# Patient Record
Sex: FEMALE | Race: WHITE | Employment: OTHER | URBAN - METROPOLITAN AREA
[De-identification: names, ages, dates, MRNs, and addresses within clinical notes are randomized per-mention and may not be internally consistent; named-entity substitution may affect disease eponyms.]

---

## 2023-03-06 ENCOUNTER — HOSPITAL ENCOUNTER (OUTPATIENT)
Age: 68
Setting detail: OUTPATIENT SURGERY
Discharge: HOME | End: 2023-03-06
Attending: INTERNAL MEDICINE | Admitting: INTERNAL MEDICINE
Payer: MEDICARE

## 2023-03-06 ENCOUNTER — ANESTHESIA (OUTPATIENT)
Dept: ENDOSCOPY | Age: 68
End: 2023-03-06
Payer: MEDICARE

## 2023-03-06 ENCOUNTER — ANESTHESIA EVENT (OUTPATIENT)
Dept: ENDOSCOPY | Age: 68
End: 2023-03-06
Payer: MEDICARE

## 2023-03-06 VITALS
SYSTOLIC BLOOD PRESSURE: 118 MMHG | DIASTOLIC BLOOD PRESSURE: 76 MMHG | BODY MASS INDEX: 35.85 KG/M2 | RESPIRATION RATE: 16 BRPM | OXYGEN SATURATION: 94 % | WEIGHT: 210 LBS | TEMPERATURE: 97.2 F | HEIGHT: 64 IN | HEART RATE: 70 BPM

## 2023-03-06 DIAGNOSIS — Z12.11 COLON CANCER SCREENING: ICD-10-CM

## 2023-03-06 DIAGNOSIS — Z86.010 HISTORY OF COLON POLYPS: ICD-10-CM

## 2023-03-06 DIAGNOSIS — Z80.0 FAMILY HISTORY OF COLON CANCER: ICD-10-CM

## 2023-03-06 LAB
GLUCOSE BLD-MCNC: 195 MG/DL (ref 70–99)
PERFORMED ON: ABNORMAL

## 2023-03-06 PROCEDURE — 7100000010 HC PHASE II RECOVERY - FIRST 15 MIN: Performed by: INTERNAL MEDICINE

## 2023-03-06 PROCEDURE — 2580000003 HC RX 258: Performed by: FAMILY MEDICINE

## 2023-03-06 PROCEDURE — 3700000000 HC ANESTHESIA ATTENDED CARE: Performed by: INTERNAL MEDICINE

## 2023-03-06 PROCEDURE — 6360000002 HC RX W HCPCS: Performed by: NURSE ANESTHETIST, CERTIFIED REGISTERED

## 2023-03-06 PROCEDURE — 7100000011 HC PHASE II RECOVERY - ADDTL 15 MIN: Performed by: INTERNAL MEDICINE

## 2023-03-06 PROCEDURE — 2500000003 HC RX 250 WO HCPCS: Performed by: NURSE ANESTHETIST, CERTIFIED REGISTERED

## 2023-03-06 PROCEDURE — 3700000001 HC ADD 15 MINUTES (ANESTHESIA): Performed by: INTERNAL MEDICINE

## 2023-03-06 PROCEDURE — 2709999900 HC NON-CHARGEABLE SUPPLY: Performed by: INTERNAL MEDICINE

## 2023-03-06 PROCEDURE — 3609010600 HC COLONOSCOPY POLYPECTOMY SNARE/COLD BIOPSY: Performed by: INTERNAL MEDICINE

## 2023-03-06 PROCEDURE — 88305 TISSUE EXAM BY PATHOLOGIST: CPT

## 2023-03-06 RX ORDER — ROSUVASTATIN CALCIUM 20 MG/1
20 TABLET, COATED ORAL DAILY
COMMUNITY
Start: 2023-02-04

## 2023-03-06 RX ORDER — PROPOFOL 10 MG/ML
INJECTION, EMULSION INTRAVENOUS CONTINUOUS PRN
Status: DISCONTINUED | OUTPATIENT
Start: 2023-03-06 | End: 2023-03-06 | Stop reason: SDUPTHER

## 2023-03-06 RX ORDER — PROPOFOL 10 MG/ML
INJECTION, EMULSION INTRAVENOUS PRN
Status: DISCONTINUED | OUTPATIENT
Start: 2023-03-06 | End: 2023-03-06 | Stop reason: SDUPTHER

## 2023-03-06 RX ORDER — LIDOCAINE HYDROCHLORIDE 20 MG/ML
INJECTION, SOLUTION EPIDURAL; INFILTRATION; INTRACAUDAL; PERINEURAL PRN
Status: DISCONTINUED | OUTPATIENT
Start: 2023-03-06 | End: 2023-03-06 | Stop reason: SDUPTHER

## 2023-03-06 RX ORDER — SODIUM CHLORIDE, SODIUM LACTATE, POTASSIUM CHLORIDE, CALCIUM CHLORIDE 600; 310; 30; 20 MG/100ML; MG/100ML; MG/100ML; MG/100ML
INJECTION, SOLUTION INTRAVENOUS CONTINUOUS
Status: DISCONTINUED | OUTPATIENT
Start: 2023-03-06 | End: 2023-03-06 | Stop reason: HOSPADM

## 2023-03-06 RX ADMIN — PROPOFOL 100 MG: 10 INJECTION, EMULSION INTRAVENOUS at 13:36

## 2023-03-06 RX ADMIN — PROPOFOL 140 MCG/KG/MIN: 10 INJECTION, EMULSION INTRAVENOUS at 13:36

## 2023-03-06 RX ADMIN — SODIUM CHLORIDE, POTASSIUM CHLORIDE, SODIUM LACTATE AND CALCIUM CHLORIDE: 600; 310; 30; 20 INJECTION, SOLUTION INTRAVENOUS at 13:13

## 2023-03-06 RX ADMIN — LIDOCAINE HYDROCHLORIDE 50 MG: 20 INJECTION, SOLUTION EPIDURAL; INFILTRATION; INTRACAUDAL; PERINEURAL at 13:36

## 2023-03-06 ASSESSMENT — PAIN SCALES - GENERAL
PAINLEVEL_OUTOF10: 0
PAINLEVEL_OUTOF10: 0

## 2023-03-06 ASSESSMENT — PAIN - FUNCTIONAL ASSESSMENT: PAIN_FUNCTIONAL_ASSESSMENT: NONE - DENIES PAIN

## 2023-03-06 NOTE — PROGRESS NOTES
Ambulatory Surgery/Procedure Discharge Note  Pt arrived in sds Iv patent and on L side  Vs stable  Dr Kowalski here to see and talk to pt and family  O2 dcd   Verbal and written discharge instructions given to pt and family  IV dcd fluids taken well  Pt dressed and dcd per wheelchair to car with family present    Vitals:    03/06/23 1425   BP: 112/67   Pulse: 83   Resp: 16   Temp: 97.2 °F (36.2 °C)   SpO2: 93%       In: 900 [I.V.:900]  Out: -     Restroom use offered before discharge.  Yes    Pain assessment:  level of pain (1-10, 10 severe),   Pain Level: 0        Patient discharged to home/self care. Patient discharged via wheel chair by transporter to waiting family/S.O.       3/6/2023 2:27 PM

## 2023-03-06 NOTE — DISCHARGE INSTRUCTIONS
Vasquez Arreola MD   Physician   Gastroenterology   Op Note       Signed   Date of Service:  3/6/2023  1:31 PM              Case Time:  Procedures:  Surgeons:    3/6/2023  1:31 PM COLONOSCOPY POLYPECTOMY SNARE/COLD BIOPSY    Vasquez Arreola MD               Signed                                                                                                                                           Colonoscopy Procedure  Note              Patient: Prakash Verma                    : 1955                      CSN: [id number]     Procedure: Colonoscopy with snare and forceps polypectomy     Date:  3/6/2023     Surgeon:  Vasquez Arreola MD     Referring Physician:   Lex RIVAS     Preoperative Diagnosis: Personal history of adenomatous colonic polyps     Postoperative Diagnosis: Internal and external hemorrhoids, diverticulosis, multiple colon polyp     Anesthesia:  MAC per anesthesia record. EBL:    <5 mL     Indications: This is a 75y.o. year old female who presents today with history of adenomatous colonic polyps on last colonoscopy , need for surveillance. Procedure: An informed consent was obtained from the patient after explanation of indications, benefits, possible risks and complications of the procedure. The patient was then taken to the endoscopy suite, placed in the left lateral decubitus position, and the above IV anesthesia was administered. The Olympus colonoscope was inserted through the anus into the rectum. The scope was then carefully advanced through the length of the colon to the cecum. The ileocecal valve and appendiceal orifice was visualized. The quality of preparation was good. Water was used to clear remaining stool from the colon to allow for careful examination of the mucosa with insufflation. The scope was carefully withdrawn for more than 6 minutes. Retroflexion was performed in the rectum.   The scope was then straightened, the colon was decompressed, and the scope was withdrawn. Findings:   -Perianal and digital rectal exam: Small circumferential external hemorrhoids.  -Rectum: Medium sized internal hemorrhoids noted on retroflexion.   -Sigmoid: Moderately severe diverticulosis with mycosis coli, multiple medium sized diverticula, and tortuosity noted in sigmoid  -Descending: 3 sessile polyps ranging in size from 3 to 6 mm removed completely with cold snare and sent for histology  -Transverse: 4 sessile polyps ranging in size from 2 to 5 mm removed completely with cold forceps, and cold snare, sent for histology. -Ascending: Normal  -Cecum: 2 mm flat polyp removed completely with cold forceps and sent for histology     -- Polyps were submitted together given small nature of the lesions. The patient tolerated the procedure well and was taken to the PACU in good condition. Complications: No immediate complications. Impression:    -Internal and external hemorrhoids  -Diverticulosis  -Small and diminutive colonic polyps x8     Recommendations:    -Await pathology results  -Repeat colonoscopy in 3 year pending polyp histology  -25 g dietary fiber daily recommended for diverticulosis     Ty Segura MD  [facility]  3/6/2023               ENDOSCOPY DISCHARGE INSTRUCTIONS:    Call the physician that did your procedure for any questions or concern:    [facility]: [phone]  DR. FARIDEH ALVES      ACTIVITY:    There are potential side effects to the medications used for sedation and anesthesia during your procedure. These include:  Dizziness or light-headedness, confusion or memory loss, delayed reaction times, loss of coordination, nausea and vomiting. Because of your increased risk for injury, we ask that you observe the following precautions: For the next 24 hours,  DO NOT operate an automobile, bicycle, motorcycle, , power tools or large equipment of any kind.   Do not drink alcohol, sign any legal documents or make any legal decisions for 24 hours. Do not bend your head over lower than your heart. DO sit on the side of bed/couch awhile before getting up. Plan on bedrest or quiet relaxation today. You may resume normal activities in 24 hours. DIET:    Your first meal today should be light, avoiding spicy and fatty foods. If you tolerate this first meal, then you may advance to your regular diet unless otherwise advised by your physician. NORMAL SYMPTOMS:  -Mild sore throat if youve had an EGD   -Gaseous discomfort    NOTIFY YOUR PHYSICIAN IF THESE SYMPTOMS OCCUR:  1. Fever (greater than 100)  5. Increased abdominal bloating  2. Severe pain    6. Excessive bleeding  3. Nausea and vomiting  7. Chest pain                                                                    4. Chills    8. Shortness of breath    ADDITIONAL INSTRUCTIONS:    Biopsy results: Call [address],[facility] for biopsy results in 1 week    Educational Information:          Please review these discharge instructions this evening or tomorrow for  information you may have forgotten. We want to thank you for choosing the [facility] as your health care provider. We always strive to provide you with excellent care while you are here. You may receive a survey in the mail regarding your care. We would appreciate you taking a few minutes of your time to complete this survey. ENDOSCOPY DISCHARGE INSTRUCTIONS:    Call the physician that did your procedure for any questions or concern:    [facility]: [phone]  DR. FARIDEH ALVES      ACTIVITY:    There are potential side effects to the medications used for sedation and anesthesia during your procedure. These include:  Dizziness or light-headedness, confusion or memory loss, delayed reaction times, loss of coordination, nausea and vomiting. Because of your increased risk for injury, we ask that you observe the following precautions:   For the next 24 hours,  DO NOT operate an automobile, bicycle, motorcycle, , power tools or large equipment of any kind. Do not drink alcohol, sign any legal documents or make any legal decisions for 24 hours. Do not bend your head over lower than your heart. DO sit on the side of bed/couch awhile before getting up. Plan on bedrest or quiet relaxation today. You may resume normal activities in 24 hours. DIET:    Your first meal today should be light, avoiding spicy and fatty foods. If you tolerate this first meal, then you may advance to your regular diet unless otherwise advised by your physician. NORMAL SYMPTOMS:  -Mild sore throat if youve had an EGD   -Gaseous discomfort    NOTIFY YOUR PHYSICIAN IF THESE SYMPTOMS OCCUR:  1. Fever (greater than 100)  5. Increased abdominal bloating  2. Severe pain    6. Excessive bleeding  3. Nausea and vomiting  7. Chest pain                                                                    4. Chills    8. Shortness of breath    ADDITIONAL INSTRUCTIONS:    Biopsy results: Call [address],[facility] for biopsy results in 1 week    Educational Information:          Please review these discharge instructions this evening or tomorrow for  information you may have forgotten. We want to thank you for choosing the [facility] as your health care provider. We always strive to provide you with excellent care while you are here. You may receive a survey in the mail regarding your care. We would appreciate you taking a few minutes of your time to complete this survey.

## 2023-03-06 NOTE — H&P
Gastroenterology Note             Pre-operative History and Physical    Patient: Horace Vega  : 1955  CSN: [id number]    History Obtained From:  Patient and/or guardian. HISTORY OF PRESENT ILLNESS:    Indication: The patient is a 75 y.o. female  here for colonoscopy. Personal history of adenomatous colonic polyps. Past Medical History:    Past Medical History:   Diagnosis Date    Dental crowns present     Diabetes mellitus ([address]     Hyperlipidemia     Hypertension      Past Surgical History:    Past Surgical History:   Procedure Laterality Date    ANKLE SURGERY Left 2014    excision cyst,neurolysis    CHOLECYSTECTOMY      FOOT SURGERY Left     great toe    HAND SURGERY Left     finger    JOINT REPLACEMENT Right     knee    OTHER SURGICAL HISTORY      Sensitive to medications    SHOULDER SURGERY Bilateral      Medications Prior to Admission:   No current facility-administered medications on file prior to encounter. Current Outpatient Medications on File Prior to Encounter   Medication Sig Dispense Refill    metFORMIN (GLUCOPHAGE) 500 MG tablet Take 1,000 mg by mouth 2 times daily (with meals)      rosuvastatin (CRESTOR) 20 MG tablet Take 20 mg by mouth daily      HYDROcodone-acetaminophen (NORCO) 5-325 MG per tablet Take 1 tablet by mouth every 6 hours as needed for Pain for up to 50 doses. (Patient not taking: Reported on 3/6/2023) 50 tablet 0    gabapentin (NEURONTIN) 300 MG capsule Take 1 capsule by mouth every evening. (Patient not taking: Reported on 3/6/2023) 60 capsule 1    amLODIPine-benazepril (LOTREL) 5-10 MG per capsule Take 1 capsule by mouth daily. aspirin 81 MG tablet Take 81 mg by mouth daily. choline fenofibrate (TRILIPIX) 135 MG CPDR delayed release capsule Take 135 mg by mouth daily. (Patient not taking: Reported on 3/6/2023)      glipiZIDE (GLUCOTROL) 5 MG tablet Take 5 mg by mouth 2 times daily (before meals).       Loratadine 10 MG CAPS Take  by mouth as needed. sAXagliptin (ONGLYZA) 5 MG TABS tablet Take 5 mg by mouth daily. (Patient not taking: Reported on 3/6/2023)      sertraline (ZOLOFT) 50 MG tablet Take 25 mg by mouth daily. simvastatin (ZOCOR) 20 MG tablet Take 20 mg by mouth nightly. (Patient not taking: Reported on 3/6/2023)      atomoxetine (STRATTERA) 40 MG capsule Take 40 mg by mouth daily. vitamin D (ERGOCALCIFEROL) 12282 UNITS CAPS capsule Take 50,000 Units by mouth once a week. Allergies:  Biaxin [clarithromycin]      Social History:   Social History     Tobacco Use    Smoking status: Former     Types: Cigarettes     Quit date:      Years since quittin.1    Smokeless tobacco: Not on file   Substance Use Topics    Alcohol use: No     Family History:   History reviewed. No pertinent family history. PHYSICAL EXAM:      /77   Pulse 93   Temp 97.5 °F (36.4 °C) (Temporal)   Resp 15   Ht 5' 3.5\" (1.613 m)   Wt 210 lb (95.3 kg)   LMP 1999   SpO2 97%   BMI 36.62 kg/m²  I        Heart:   RRR, normal s1s2    Lungs:  CTA bilat,  Normal effort    Abdomen:   NT, ND      ASA Grade:  ASA 3 - Patient with moderate systemic disease with functional limitations    Mallampati Class: 2          ASSESSMENT AND PLAN:    1. Patient is a 75 y.o. female here for Colonoscopy with MAC.   2.  Procedure options, risks, and benefits reviewed with patient who expresses understanding and consent.     Ty Segura MD,   [facility]ce [facility]  3/6/2023

## 2023-03-06 NOTE — ANESTHESIA POSTPROCEDURE EVALUATION
Department of Anesthesiology  Postprocedure Note    Patient: Nnamdi Link  MRN: [id number]  YOB: 1955  Date of evaluation: 3/6/2023      Procedure Summary     Date: 03/06/23 Room / Location: Erica Pauljacqui / [facility]    Anesthesia Start: 1437 Anesthesia Stop: 5462    Procedure: COLONOSCOPY POLYPECTOMY SNARE/COLD BIOPSY Diagnosis:       Colon cancer screening      Family history of colon cancer      History of colon polyps      (Colon cancer screening [Z12.11] Family history of colon cancer [Z80.0] History of colon polyps [Z86.010])    Surgeons: Anthony Alejandre MD Responsible Provider: Shea Patino MD    Anesthesia Type: MAC ASA Status: 3          Anesthesia Type: No value filed.     Windy Phase I: Windy Score: 10    Windy Phase II: Windy Score: 10      Anesthesia Post Evaluation    Patient location during evaluation: PACU  Patient participation: complete - patient participated  Level of consciousness: awake and alert  Pain score: 0  Airway patency: patent  Nausea & Vomiting: no nausea and no vomiting  Complications: no  Cardiovascular status: hemodynamically stable  Respiratory status: acceptable  Hydration status: euvolemic

## 2023-03-06 NOTE — OP NOTE
Colonoscopy Procedure  Note          Patient: Pj Aguayo  : 1955  CSN: [id number]    Procedure: Colonoscopy with snare and forceps polypectomy    Date:  3/6/2023    Surgeon:  Trinity Aguilar MD    Referring Physician:   Chevy RIVAS    Preoperative Diagnosis: Personal history of adenomatous colonic polyps    Postoperative Diagnosis: Internal and external hemorrhoids, diverticulosis, multiple colon polyp    Anesthesia:  MAC per anesthesia record. EBL: <5 mL    Indications: This is a 75y.o. year old female who presents today with history of adenomatous colonic polyps on last colonoscopy , need for surveillance. Procedure: An informed consent was obtained from the patient after explanation of indications, benefits, possible risks and complications of the procedure. The patient was then taken to the endoscopy suite, placed in the left lateral decubitus position, and the above IV anesthesia was administered. The Olympus colonoscope was inserted through the anus into the rectum. The scope was then carefully advanced through the length of the colon to the cecum. The ileocecal valve and appendiceal orifice was visualized. The quality of preparation was good. Water was used to clear remaining stool from the colon to allow for careful examination of the mucosa with insufflation. The scope was carefully withdrawn for more than 6 minutes. Retroflexion was performed in the rectum. The scope was then straightened, the colon was decompressed, and the scope was withdrawn.      Findings:   -Perianal and digital rectal exam: Small circumferential external hemorrhoids.  -Rectum: Medium sized internal hemorrhoids noted on retroflexion.   -Sigmoid: Moderately severe diverticulosis with mycosis coli, multiple medium sized diverticula, and tortuosity noted in sigmoid  -Descending: 3 sessile polyps ranging in size from 3 to 6 mm removed completely with cold snare and sent for histology  -Transverse: 4 sessile polyps ranging in size from 2 to 5 mm removed completely with cold forceps, and cold snare, sent for histology. -Ascending: Normal  -Cecum: 2 mm flat polyp removed completely with cold forceps and sent for histology    -- Polyps were submitted together given small nature of the lesions. The patient tolerated the procedure well and was taken to the PACU in good condition. Complications: No immediate complications.      Impression:    -Internal and external hemorrhoids  -Diverticulosis  -Small and diminutive colonic polyps x8    Recommendations:    -Await pathology results  -Repeat colonoscopy in 3 year pending polyp histology  -25 g dietary fiber daily recommended for diverticulosis    Carolann Medel, [address] [place] [address]  3/6/2023

## 2023-03-06 NOTE — ANESTHESIA PRE PROCEDURE
Department of Anesthesiology  Preprocedure Note       Name:  Jose Patton   Age:  75 y.o.  :  1955                                          MRN:  [id number]         Date:  3/6/2023      Surgeon: Kiko Robertson):  Spencer Baird MD    Procedure: Procedure(s):  COLONOSCOPY    Medications prior to admission:   Prior to Admission medications    Medication Sig Start Date End Date Taking? Authorizing Provider   metFORMIN (GLUCOPHAGE) 500 MG tablet Take 1,000 mg by mouth 2 times daily (with meals)   Yes Historical Provider, MD   HYDROcodone-acetaminophen (NORCO) 5-325 MG per tablet Take 1 tablet by mouth every 6 hours as needed for Pain for up to 50 doses. Patient not taking: Reported on 3/6/2023 7/16/14   Paz Ceja DPM   gabapentin (NEURONTIN) 300 MG capsule Take 1 capsule by mouth every evening. Patient not taking: Reported on 3/6/2023 7/16/14   Paz Ceja DPM   amLODIPine-benazepril (LOTREL) 5-10 MG per capsule Take 1 capsule by mouth daily. Historical Provider, MD   aspirin 81 MG tablet Take 81 mg by mouth daily. Historical Provider, MD   choline fenofibrate (TRILIPIX) 135 MG CPDR delayed release capsule Take 135 mg by mouth daily. Patient not taking: Reported on 3/6/2023    Historical Provider, MD   glipiZIDE (GLUCOTROL) 5 MG tablet Take 5 mg by mouth 2 times daily (before meals). Historical Provider, MD   Loratadine 10 MG CAPS Take  by mouth as needed. Historical Provider, MD   saxagliptin (ONGLYZA) 5 MG TABS tablet Take 5 mg by mouth daily. Historical Provider, MD   sertraline (ZOLOFT) 50 MG tablet Take 25 mg by mouth daily. Historical Provider, MD   simvastatin (ZOCOR) 20 MG tablet Take 20 mg by mouth nightly. Historical Provider, MD   atomoxetine (STRATTERA) 40 MG capsule Take 40 mg by mouth daily. Historical Provider, MD   vitamin D (ERGOCALCIFEROL) 14216 UNITS CAPS capsule Take 50,000 Units by mouth once a week.     Historical Provider, MD       Current medications:    Current Facility-Administered Medications   Medication Dose Route Frequency Provider Last Rate Last Admin    lactated ringers IV soln infusion   IntraVENous Continuous King Gonzalez MD           Allergies: Allergies   Allergen Reactions    Biaxin [Clarithromycin] Diarrhea       Problem List:  There is no problem list on file for this patient. Past Medical History:        Diagnosis Date    Dental crowns present     Diabetes mellitus ([facility] [address]     Hyperlipidemia     Hypertension        Past Surgical History:        Procedure Laterality Date    ANKLE SURGERY Left 7/16/14    excision cyst,neurolysis    CHOLECYSTECTOMY      FOOT SURGERY Left     great toe    HAND SURGERY Left     finger    JOINT REPLACEMENT Right     knee    OTHER SURGICAL HISTORY      Sensitive to medications       Social History:    Social History     Tobacco Use    Smoking status: Never    Smokeless tobacco: Not on file   Substance Use Topics    Alcohol use: No                                Counseling given: Not Answered      Vital Signs (Current):   Vitals:    03/06/23 1204   BP: 132/77   Pulse: 93   Resp: 15   Temp: 97.5 °F (36.4 °C)   TempSrc: Temporal   SpO2: 97%   Weight: 210 lb (95.3 kg)   Height: 5' 3.5\" (1.613 m)                                              BP Readings from Last 3 Encounters:   03/06/23 132/77   07/16/14 118/74       NPO Status:                                                                                 BMI:   Wt Readings from Last 3 Encounters:   03/06/23 210 lb (95.3 kg)   07/16/14 225 lb (102.1 kg)   07/14/14 225 lb (102.1 kg)     Body mass index is 36.62 kg/m².     CBC: No results found for: WBC, RBC, HGB, HCT, MCV, RDW, PLT    CMP:   Lab Results   Component Value Date/Time     07/16/2014 06:40 AM    K 4.5 07/16/2014 06:40 AM     07/16/2014 06:40 AM    CO2 23 07/16/2014 06:40 AM    BUN 15 07/16/2014 06:40 AM    CREATININE 0.5 07/16/2014 06:40 AM    GFRAA >60 07/16/2014 06:40 AM    LABGLOM >60 07/16/2014 06:40 AM    GLUCOSE 152 07/16/2014 06:40 AM    CALCIUM 9.0 07/16/2014 06:40 AM       POC Tests: No results for input(s): POCGLU, POCNA, POCK, POCCL, POCBUN, POCHEMO, POCHCT in the last 72 hours. Coags: No results found for: PROTIME, INR, APTT    HCG (If Applicable): No results found for: PREGTESTUR, PREGSERUM, HCG, HCGQUANT     ABGs: No results found for: PHART, PO2ART, REO3CDV, RBY2GSU, BEART, I3OQOGXZ     Type & Screen (If Applicable):  No results found for: LABABO, LABRH    Drug/Infectious Status (If Applicable):  No results found for: HIV, HEPCAB    COVID-19 Screening (If Applicable): No results found for: COVID19        Anesthesia Evaluation  Patient summary reviewed  Airway: Mallampati: II  TM distance: >3 FB   Neck ROM: full  Mouth opening: > = 3 FB   Dental: normal exam         Pulmonary:normal exam    (+) sleep apnea: on noncompliant,                             Cardiovascular:  Exercise tolerance: good (>4 METS),   (+) hypertension:,                   Neuro/Psych:   Negative Neuro/Psych ROS              GI/Hepatic/Renal: Neg GI/Hepatic/Renal ROS            Endo/Other:    (+) DiabetesType II DM, no interval change, , .                 Abdominal:             Vascular: negative vascular ROS. Other Findings:           Anesthesia Plan      MAC     ASA 3       Induction: intravenous. Anesthetic plan and risks discussed with patient.         Attending anesthesiologist reviewed and agrees with Artemio Luna MD   3/6/2023

## (undated) DEVICE — SNARE COLD DIAMOND 10MM THIN

## (undated) DEVICE — FORCEPS BX L240CM JAW DIA2.8MM L CAP W/ NDL MIC MESH TOOTH

## (undated) DEVICE — TRAP SPEC RETRV CLR PLAS POLYP IN LN SUCT QUIK CTCH

## (undated) DEVICE — CANNULA SAMP CO2 AD GRN 7FT CO2 AND 7FT O2 TBNG UNIV CONN